# Patient Record
Sex: MALE | Race: WHITE | NOT HISPANIC OR LATINO | Employment: OTHER | URBAN - METROPOLITAN AREA
[De-identification: names, ages, dates, MRNs, and addresses within clinical notes are randomized per-mention and may not be internally consistent; named-entity substitution may affect disease eponyms.]

---

## 2017-08-23 ENCOUNTER — SURGERY/PROCEDURE (OUTPATIENT)
Dept: URBAN - METROPOLITAN AREA SURGERY 14 | Facility: SURGERY | Age: 77
End: 2017-08-23

## 2017-08-23 DIAGNOSIS — H26.491: ICD-10-CM

## 2017-08-23 PROCEDURE — 66821 AFTER CATARACT LASER SURGERY: CPT

## 2017-09-13 ENCOUNTER — POST-OP (OUTPATIENT)
Dept: URBAN - METROPOLITAN AREA CLINIC 43 | Facility: CLINIC | Age: 77
End: 2017-09-13

## 2017-09-13 DIAGNOSIS — Z96.1: ICD-10-CM

## 2017-09-13 PROCEDURE — 99024 POSTOP FOLLOW-UP VISIT: CPT

## 2017-09-13 ASSESSMENT — TONOMETRY
OS_IOP_MMHG: 20
OD_IOP_MMHG: 17

## 2017-09-13 ASSESSMENT — VISUAL ACUITY
OD_SC: 20/30-2
OS_SC: J6
OS_SC: 20/30-2
OD_SC: J12

## 2018-01-05 ENCOUNTER — ESTABLISHED COMPREHENSIVE EXAM (OUTPATIENT)
Dept: URBAN - METROPOLITAN AREA CLINIC 43 | Facility: CLINIC | Age: 78
End: 2018-01-05

## 2018-01-05 DIAGNOSIS — Z96.1: ICD-10-CM

## 2018-01-05 DIAGNOSIS — H43.811: ICD-10-CM

## 2018-01-05 PROCEDURE — 1036F TOBACCO NON-USER: CPT

## 2018-01-05 PROCEDURE — 92014 COMPRE OPH EXAM EST PT 1/>: CPT

## 2018-01-05 PROCEDURE — 92015 DETERMINE REFRACTIVE STATE: CPT

## 2018-01-05 PROCEDURE — G8427 DOCREV CUR MEDS BY ELIG CLIN: HCPCS

## 2018-01-05 ASSESSMENT — VISUAL ACUITY
OS_CC: J1
OD_SC: J12
OS_SC: J10-
OD_SC: 20/30-1
OD_CC: J2-
OS_SC: 20/30-1

## 2018-01-05 ASSESSMENT — TONOMETRY
OS_IOP_MMHG: 18
OD_IOP_MMHG: 17

## 2018-06-20 ENCOUNTER — EST. PATIENT EMERGENCY (OUTPATIENT)
Dept: URBAN - METROPOLITAN AREA CLINIC 43 | Facility: CLINIC | Age: 78
End: 2018-06-20

## 2018-06-20 DIAGNOSIS — H11.31: ICD-10-CM

## 2018-06-20 PROCEDURE — G8427 DOCREV CUR MEDS BY ELIG CLIN: HCPCS

## 2018-06-20 PROCEDURE — G8756 NO BP MEASURE DOC: HCPCS

## 2018-06-20 PROCEDURE — 4040F PNEUMOC VAC/ADMIN/RCVD: CPT

## 2018-06-20 PROCEDURE — 1036F TOBACCO NON-USER: CPT

## 2018-06-20 PROCEDURE — 99212 OFFICE O/P EST SF 10 MIN: CPT

## 2018-06-20 ASSESSMENT — VISUAL ACUITY
OD_CC: 20/30-2
OS_CC: 20/40+2

## 2018-06-20 ASSESSMENT — TONOMETRY: OS_IOP_MMHG: 17

## 2019-01-30 ENCOUNTER — ESTABLISHED COMPREHENSIVE EXAM (OUTPATIENT)
Dept: URBAN - METROPOLITAN AREA CLINIC 43 | Facility: CLINIC | Age: 79
End: 2019-01-30

## 2019-01-30 DIAGNOSIS — Z96.1: ICD-10-CM

## 2019-01-30 DIAGNOSIS — H43.811: ICD-10-CM

## 2019-01-30 DIAGNOSIS — H26.492: ICD-10-CM

## 2019-01-30 DIAGNOSIS — E11.9: ICD-10-CM

## 2019-01-30 PROCEDURE — 92015 DETERMINE REFRACTIVE STATE: CPT

## 2019-01-30 PROCEDURE — 92014 COMPRE OPH EXAM EST PT 1/>: CPT

## 2019-01-30 PROCEDURE — G8756 NO BP MEASURE DOC: HCPCS

## 2019-01-30 PROCEDURE — 1036F TOBACCO NON-USER: CPT

## 2019-01-30 PROCEDURE — 3072F LOW RISK FOR RETINOPATHY: CPT

## 2019-01-30 PROCEDURE — G8427 DOCREV CUR MEDS BY ELIG CLIN: HCPCS

## 2019-01-30 PROCEDURE — G9903 PT SCRN TBCO ID AS NON USER: HCPCS

## 2019-01-30 PROCEDURE — 2022F DILAT RTA XM EVC RTNOPTHY: CPT

## 2019-01-30 ASSESSMENT — TONOMETRY
OD_IOP_MMHG: 16
OS_IOP_MMHG: 16

## 2019-01-30 ASSESSMENT — VISUAL ACUITY
OD_CC: J2
OS_SC: J8-
OS_SC: 20/30+2
OD_SC: 20/40+1
OS_CC: J1
OD_CC: 20/30-1
OS_CC: 20/40-1
OD_SC: J8-

## 2019-02-07 ENCOUNTER — ESTABLISHED PATIENT (OUTPATIENT)
Dept: URBAN - METROPOLITAN AREA CLINIC 39 | Facility: CLINIC | Age: 79
End: 2019-02-07

## 2019-02-07 ENCOUNTER — SURGERY/PROCEDURE (OUTPATIENT)
Dept: URBAN - METROPOLITAN AREA SURGERY 14 | Facility: SURGERY | Age: 79
End: 2019-02-07

## 2019-02-07 DIAGNOSIS — H26.492: ICD-10-CM

## 2019-02-07 PROCEDURE — 92014 COMPRE OPH EXAM EST PT 1/>: CPT

## 2019-02-07 PROCEDURE — 66821 AFTER CATARACT LASER SURGERY: CPT

## 2019-02-07 ASSESSMENT — VISUAL ACUITY
OD_SC: 20/40
OS_GLARE: 20/70
OS_SC: 20/40
OD_CC: 20/40
OS_CC: 20/40

## 2019-02-07 ASSESSMENT — TONOMETRY
OD_IOP_MMHG: 15
OS_IOP_MMHG: 15

## 2019-02-15 ENCOUNTER — YAG POST-OP (OUTPATIENT)
Dept: URBAN - METROPOLITAN AREA CLINIC 43 | Facility: CLINIC | Age: 79
End: 2019-02-15

## 2019-02-15 DIAGNOSIS — H52.03: ICD-10-CM

## 2019-02-15 DIAGNOSIS — Z98.890: ICD-10-CM

## 2019-02-15 PROCEDURE — 99024 POSTOP FOLLOW-UP VISIT: CPT

## 2019-02-15 ASSESSMENT — TONOMETRY
OD_IOP_MMHG: 15
OS_IOP_MMHG: 15

## 2019-02-15 ASSESSMENT — VISUAL ACUITY
OD_SC: 20/30
OS_SC: 20/40

## 2020-05-08 ENCOUNTER — ESTABLISHED COMPREHENSIVE EXAM (OUTPATIENT)
Dept: URBAN - METROPOLITAN AREA CLINIC 43 | Facility: CLINIC | Age: 80
End: 2020-05-08

## 2020-05-08 DIAGNOSIS — H52.03: ICD-10-CM

## 2020-05-08 DIAGNOSIS — H04.123: ICD-10-CM

## 2020-05-08 DIAGNOSIS — E11.9: ICD-10-CM

## 2020-05-08 PROCEDURE — 92014 COMPRE OPH EXAM EST PT 1/>: CPT

## 2020-05-08 PROCEDURE — 92015 DETERMINE REFRACTIVE STATE: CPT

## 2020-05-08 ASSESSMENT — TONOMETRY
OD_IOP_MMHG: 16
OS_IOP_MMHG: 16

## 2020-05-08 ASSESSMENT — VISUAL ACUITY
OD_SC: 20/40
OS_SC: 20/40-1
OS_CC: 20/30-2
OD_SC: J12
OS_SC: J8
OD_CC: J1
OD_CC: 20/30-2
OS_CC: J1

## 2020-07-17 NOTE — PATIENT DISCUSSION
New Prescription: Keflex (cephalexin): capsule: 500 mg 1 capsule twice a day as directed by mouth 07-

## 2020-07-17 NOTE — PATIENT DISCUSSION
New Prescription: Tobradex ST (tobramycin-dexamethasone): drops,suspension: 0.3-0.05% 1 drop three times a day into left eye 07-

## 2020-07-17 NOTE — PATIENT DISCUSSION
DACRYOSISTITIS LLL - EDU ON FINDINGS. RX TOBRADEX 1 GTT TID OS X 1 WEEK. RX KELFEX 500MG BID PO; EDU ON SIDE EFFECTS. RECOMMEND COPIOUS WARM COMPRESSES. MONITOR X 1 WEEK OR PRN IF SYMPTOMS WORSEN.

## 2020-07-31 NOTE — PATIENT DISCUSSION
DACRYOCYISTITIS LLL - SLOWING IMRPOVING S/P ORAL KEFLEX &amp; [address]. DRAINAGE STILL PRESENT. CONTINUE TOBRADEX 1 GTT TID OS. REFER TO DR. Taiwo Barbour FOR CONSULT.

## 2020-08-04 NOTE — PATIENT DISCUSSION
The patient had a history of caniliculitis.   After opening and dilating the punctum, the canalicular system was irrigated with fulls strength betadine

## 2020-12-07 NOTE — PATIENT DISCUSSION
UNCORRECTED VISION x MANY YEARS, NORMAL TOPOS AND CORNEA - EDUCATED PATIENT ON FINDINGS &amp; UNDERSTANDS THE IMPORTANCE OF MAKING SURE HE CAN ADAPT TO FULL RX AND MUST BE STABLE TO PROCEED W/ LVC. RTC X 6 MONTHS FOR REPEAT LASIK TESTING.

## 2021-03-23 NOTE — PATIENT DISCUSSION
Punctal 2 snip for removal of old punctal plug and irrigation with betadine followed by erythromycin ointment bid for 1 week.

## 2021-03-23 NOTE — PATIENT DISCUSSION
Conveyed results to patient. Patient still taking medication. Patient to call with any questions or concerns.    MINOR PROCEDURE FOLLOW UP CARE INSTRUCTIONS: (PINK SHEET)

## 2021-05-07 ENCOUNTER — ESTABLISHED COMPREHENSIVE EXAM (OUTPATIENT)
Dept: URBAN - METROPOLITAN AREA CLINIC 43 | Facility: CLINIC | Age: 81
End: 2021-05-07

## 2021-05-07 DIAGNOSIS — E11.9: ICD-10-CM

## 2021-05-07 DIAGNOSIS — H43.393: ICD-10-CM

## 2021-05-07 DIAGNOSIS — H43.811: ICD-10-CM

## 2021-05-07 DIAGNOSIS — H52.4: ICD-10-CM

## 2021-05-07 DIAGNOSIS — H52.03: ICD-10-CM

## 2021-05-07 PROCEDURE — 92015 DETERMINE REFRACTIVE STATE: CPT

## 2021-05-07 PROCEDURE — 92014 COMPRE OPH EXAM EST PT 1/>: CPT

## 2021-05-07 ASSESSMENT — TONOMETRY
OD_IOP_MMHG: 16
OS_IOP_MMHG: 16

## 2021-05-07 ASSESSMENT — VISUAL ACUITY
OS_SC: 20/30+1
OD_CC: J2
OD_SC: J12
OD_CC: 20/25-1
OD_SC: 20/50-1
OS_SC: J10
OS_CC: 20/25-1
OS_CC: J2

## 2022-06-17 ENCOUNTER — COMPREHENSIVE EXAM (OUTPATIENT)
Dept: URBAN - METROPOLITAN AREA CLINIC 43 | Facility: CLINIC | Age: 82
End: 2022-06-17

## 2022-06-17 DIAGNOSIS — H43.393: ICD-10-CM

## 2022-06-17 DIAGNOSIS — E11.9: ICD-10-CM

## 2022-06-17 DIAGNOSIS — H52.03: ICD-10-CM

## 2022-06-17 DIAGNOSIS — H43.811: ICD-10-CM

## 2022-06-17 PROCEDURE — 92014 COMPRE OPH EXAM EST PT 1/>: CPT

## 2022-06-17 PROCEDURE — 92015 DETERMINE REFRACTIVE STATE: CPT

## 2022-06-17 ASSESSMENT — VISUAL ACUITY
OS_SC: J12
OD_SC: 20/50
OD_CC: J3
OS_SC: 20/40
OS_CC: 20/30
OS_CC: J1
OD_CC: 20/30
OD_SC: J12

## 2022-06-17 ASSESSMENT — TONOMETRY
OD_IOP_MMHG: 16
OS_IOP_MMHG: 15

## 2023-06-21 ENCOUNTER — COMPREHENSIVE EXAM (OUTPATIENT)
Dept: URBAN - METROPOLITAN AREA CLINIC 43 | Facility: CLINIC | Age: 83
End: 2023-06-21

## 2023-06-21 DIAGNOSIS — H43.811: ICD-10-CM

## 2023-06-21 DIAGNOSIS — E11.9: ICD-10-CM

## 2023-06-21 DIAGNOSIS — H52.03: ICD-10-CM

## 2023-06-21 DIAGNOSIS — H43.393: ICD-10-CM

## 2023-06-21 PROCEDURE — 92015 DETERMINE REFRACTIVE STATE: CPT

## 2023-06-21 PROCEDURE — 92014 COMPRE OPH EXAM EST PT 1/>: CPT

## 2023-06-21 ASSESSMENT — VISUAL ACUITY
OS_CC: J1
OS_SC: 20/30-2
OU_SC: 20/20-2
OS_SC: J12
OD_SC: 20/30+2
OD_CC: 20/30-2
OS_CC: 20/30-2
OD_SC: J12
OU_CC: 20/20-2
OD_CC: J1

## 2023-06-21 ASSESSMENT — TONOMETRY
OS_IOP_MMHG: 18
OD_IOP_MMHG: 18

## 2023-11-26 ENCOUNTER — HOSPITAL ENCOUNTER (OUTPATIENT)
Age: 83
Discharge: HOME | End: 2023-11-26
Attending: EMERGENCY MEDICINE

## 2023-11-26 VITALS
OXYGEN SATURATION: 99 % | WEIGHT: 225 LBS | DIASTOLIC BLOOD PRESSURE: 79 MMHG | BODY MASS INDEX: 27.98 KG/M2 | RESPIRATION RATE: 16 BRPM | HEART RATE: 62 BPM | TEMPERATURE: 98 F | SYSTOLIC BLOOD PRESSURE: 136 MMHG | HEIGHT: 75 IN

## 2023-11-26 DIAGNOSIS — J20.9 ACUTE BRONCHITIS, UNSPECIFIED ORGANISM: Primary | ICD-10-CM

## 2023-11-26 PROCEDURE — 99204 OFFICE O/P NEW MOD 45 MIN: CPT | Performed by: EMERGENCY MEDICINE

## 2023-11-26 PROCEDURE — 99202 OFFICE O/P NEW SF 15 MIN: CPT

## 2023-11-26 RX ORDER — CANAGLIFLOZIN 100 MG/1
TABLET, FILM COATED ORAL
COMMUNITY
Start: 2023-10-09

## 2023-11-26 RX ORDER — SITAGLIPTIN 100 MG/1
100 TABLET, FILM COATED ORAL DAILY
COMMUNITY
Start: 2023-09-05

## 2023-11-26 RX ORDER — PREDNISONE 20 MG/1
40 TABLET ORAL DAILY
Qty: 10 TABLET | Refills: 0 | Status: SHIPPED | OUTPATIENT
Start: 2023-11-26

## 2023-11-26 RX ORDER — RIVAROXABAN 20 MG/1
20 TABLET, FILM COATED ORAL DAILY
COMMUNITY
Start: 2023-10-05

## 2023-11-26 RX ORDER — GLIPIZIDE 5 MG/1
5 TABLET ORAL 2 TIMES DAILY
COMMUNITY
Start: 2023-09-25

## 2023-11-26 RX ORDER — DOXYCYCLINE 100 MG/1
100 TABLET ORAL 2 TIMES DAILY
Qty: 14 TABLET | Refills: 0 | Status: SHIPPED | OUTPATIENT
Start: 2023-11-26 | End: 2023-12-03

## 2023-11-26 RX ORDER — AZITHROMYCIN 250 MG/1
TABLET, FILM COATED ORAL
Qty: 6 TABLET | Refills: 0 | Status: SHIPPED | OUTPATIENT
Start: 2023-11-26 | End: 2023-11-26 | Stop reason: ALTCHOICE

## 2023-11-26 RX ORDER — ROSUVASTATIN CALCIUM 20 MG/1
20 TABLET, COATED ORAL DAILY
COMMUNITY
Start: 2023-09-06

## 2023-11-26 RX ORDER — METOPROLOL SUCCINATE 50 MG/1
50 TABLET, EXTENDED RELEASE ORAL DAILY
COMMUNITY
Start: 2023-10-05

## 2024-06-25 ENCOUNTER — COMPREHENSIVE EXAM (OUTPATIENT)
Dept: URBAN - METROPOLITAN AREA CLINIC 43 | Facility: CLINIC | Age: 84
End: 2024-06-25

## 2024-06-25 DIAGNOSIS — H43.393: ICD-10-CM

## 2024-06-25 DIAGNOSIS — H43.811: ICD-10-CM

## 2024-06-25 DIAGNOSIS — H52.03: ICD-10-CM

## 2024-06-25 DIAGNOSIS — E11.9: ICD-10-CM

## 2024-06-25 PROCEDURE — 92014 COMPRE OPH EXAM EST PT 1/>: CPT

## 2024-06-25 PROCEDURE — 92015 DETERMINE REFRACTIVE STATE: CPT

## 2024-06-25 ASSESSMENT — VISUAL ACUITY
OD_SC: J8-
OS_CC: J1
OD_CC: J1
OD_CC: 20/20-2
OD_SC: 20/20
OS_SC: J6-
OS_SC: 20/25-2
OS_CC: 20/30+2

## 2024-06-25 ASSESSMENT — TONOMETRY
OD_IOP_MMHG: 16
OS_IOP_MMHG: 19

## 2025-07-01 ENCOUNTER — COMPREHENSIVE EXAM (OUTPATIENT)
Age: 85
End: 2025-07-01

## 2025-07-01 DIAGNOSIS — H52.03: ICD-10-CM

## 2025-07-01 DIAGNOSIS — E11.9: ICD-10-CM

## 2025-07-01 DIAGNOSIS — H43.393: ICD-10-CM

## 2025-07-01 DIAGNOSIS — H43.811: ICD-10-CM

## 2025-07-01 PROCEDURE — 92014 COMPRE OPH EXAM EST PT 1/>: CPT

## 2025-07-01 PROCEDURE — 92015 DETERMINE REFRACTIVE STATE: CPT
